# Patient Record
Sex: MALE | Race: BLACK OR AFRICAN AMERICAN | NOT HISPANIC OR LATINO | Employment: FULL TIME | URBAN - METROPOLITAN AREA
[De-identification: names, ages, dates, MRNs, and addresses within clinical notes are randomized per-mention and may not be internally consistent; named-entity substitution may affect disease eponyms.]

---

## 2024-06-04 ENCOUNTER — TELEMEDICINE (OUTPATIENT)
Dept: FAMILY MEDICINE CLINIC | Facility: CLINIC | Age: 29
End: 2024-06-04
Payer: COMMERCIAL

## 2024-06-04 VITALS — WEIGHT: 257 LBS | BODY MASS INDEX: 32.98 KG/M2

## 2024-06-04 DIAGNOSIS — R63.5 WEIGHT GAIN: ICD-10-CM

## 2024-06-04 DIAGNOSIS — R53.83 OTHER FATIGUE: ICD-10-CM

## 2024-06-04 DIAGNOSIS — R79.89 LOW TESTOSTERONE: Primary | ICD-10-CM

## 2024-06-04 DIAGNOSIS — R68.82 LOW LIBIDO: ICD-10-CM

## 2024-06-04 PROCEDURE — 99214 OFFICE O/P EST MOD 30 MIN: CPT

## 2024-06-04 RX ORDER — GINSENG 100 MG
CAPSULE ORAL
COMMUNITY

## 2024-06-04 NOTE — PROGRESS NOTES
Chief Complaint  Fatigue, Weight Gain, and Labs Only    SUBJECTIVE  Willie Desouza presents to [facility] This was an audio and video enabled telemedicine encounter.  Provider location- office- [address]  [place] [address]    Patient location-  [address] [facility] [address]       History of Present Illness    Patient is a 28-year-old male who presents today via telehealth with concerns about low testosterone.  Patient reports he had low testosterone levels in the past.  Patient is requesting some additional testing to evaluate because of this.  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  Patient is established with hematology.  Patient had reviewed testosterone replacement with his hematologist and she suggested since he had a recent blood clot to withhold.  Patient reports he has noticed increased fatigue, weight gain, low sex drive.  History reviewed. No pertinent past medical history.   History reviewed. No pertinent family history.   Past Surgical History:   Procedure Laterality Date    LYMPH NODE BIOPSY  2017    Had both sides behind my neck and my right armpit removed        Current Outpatient Medications:     Omega-3 Fatty Acids (OMEGA-3 FISH OIL CONCENTRATE PO), Take  by mouth., Disp: , Rfl:     rivaroxaban (XARELTO) 20 MG tablet, Take 1 tablet by mouth Daily., Disp: 30 tablet, Rfl: 2    Zinc 50 MG tablet, Take  by mouth., Disp: , Rfl:     cetirizine (zyrTEC) 10 MG tablet, Take 1 tablet by mouth Daily. Indications: Upper Respiratory Tract Allergy (Patient not taking: Reported on 6/4/2024), Disp: , Rfl:     methocarbamol (ROBAXIN) 500 MG tablet, Take 1 tablet by mouth 4 (Four) Times a Day As Needed for Muscle Spasms. (Patient not taking: Reported on 6/4/2024), Disp: , Rfl:     OBJECTIVE  Vital Signs:   Wt 117 kg (257 lb)   BMI 32.98 kg/m²    Estimated body mass index is 32.98 kg/m² as calculated from the following:    Height as  "of 5/8/24: 188 cm (74.02\").    Weight as of this encounter: 117 kg (257 lb).     Wt Readings from Last 3 Encounters:   06/04/24 117 kg (257 lb)   05/08/24 114 kg (252 lb 3.2 oz)   04/16/24 116 kg (256 lb 3.2 oz)     BP Readings from Last 3 Encounters:   05/08/24 134/76   04/16/24 136/79       Physical Exam  Constitutional:       General: He is not in acute distress.     Appearance: He is not ill-appearing.   HENT:      Head: Normocephalic and atraumatic.   Eyes:      Conjunctiva/sclera: Conjunctivae normal.   Pulmonary:      Effort: Pulmonary effort is normal.   Neurological:      Mental Status: He is alert and oriented to person, place, and time.   Psychiatric:         Mood and Affect: Mood normal.         Behavior: Behavior normal.         Thought Content: Thought content normal.         Judgment: Judgment normal.          Result Review    Common labs          4/23/2024    12:10   Common Labs   Glucose 100    BUN 16    Creatinine 0.93    Sodium 140    Potassium 4.3    Chloride 102    Calcium 10.0    Albumin 4.7    Total Bilirubin 0.7    Alkaline Phosphatase 87    AST (SGOT) 19    ALT (SGPT) 26    WBC 4.62    Hemoglobin 15.7    Hematocrit 46.8    Platelets 299    Total Cholesterol 223    Triglycerides 78    HDL Cholesterol 44    LDL Cholesterol  165        US Venous Doppler Lower Extremity Bilateral (duplex)    Result Date: 3/22/2024        No deep venous thrombosis identified of the bilateral lower extremity.  Created by: Gage Palencia MD  Signed by: Gage Palencia MD  Signed on: 3/22/2024 17:40 EDT  Location: [facility]           XR Chest 1 View    Result Date: 3/22/2024   The heart size is within normal limits. There is no evidence of mediastinal shift. There are no focal areas of consolidation in the lungs. No pneumothorax or pleural effusion. The upper abdomen is unremarkable.  Created by: Mp Garza MD    Signed by: Mp Garza MD  Signed on: 3/22/2024 6:04 EDT  [facility]  "          CT Angio Chest Pulmonary Embolism W IV Contrast    Result Date: 3/22/2024   1. Right lower lobe pulmonary arterial emboli with a wedge-shaped opacity at the right lung base that likely represents a developing parenchymal infarct  2. No thoracic aortic aneurysm or dissection  Created by: Salomón Blackburn MD  Signed by: Salomón Blackburn MD  Signed on: 3/22/2024 5:59 EDT  Location: [facility]               The above data has been reviewed by TRACY Villa 06/04/2024 09:26 EDT.          Patient Care Team:  Esperanza Velazco APRN as PCP - General (Nurse Practitioner)            ASSESSMENT & PLAN    Diagnoses and all orders for this visit:    1. Low testosterone (Primary)  -     Dihydrotestosterone; Future  -     Sex Horm Binding Globulin; Future  -     Ferritin; Future  -     Estrogens, Fractionated; Future  -     Estrogens, Total; Future  -     Ambulatory Referral to Endocrinology    2. Other fatigue  -     Dihydrotestosterone; Future  -     Sex Horm Binding Globulin; Future  -     Ferritin; Future  -     Estrogens, Fractionated; Future  -     Estrogens, Total; Future  -     Ambulatory Referral to Endocrinology    3. Weight gain  -     Dihydrotestosterone; Future  -     Sex Horm Binding Globulin; Future  -     Ferritin; Future  -     Estrogens, Fractionated; Future  -     Estrogens, Total; Future  -     Ambulatory Referral to Endocrinology    4. Low libido  -     Dihydrotestosterone; Future  -     Sex Horm Binding Globulin; Future  -     Ferritin; Future  -     Estrogens, Fractionated; Future  -     Estrogens, Total; Future  -     Ambulatory Referral to Endocrinology         Tobacco Use: Low Risk  (6/4/2024)    Patient History     Smoking Tobacco Use: Never     Smokeless Tobacco Use: Never     Passive Exposure: Never       Follow Up     Return if symptoms worsen or fail to improve.      Patient was given instructions and counseling regarding his condition or for health maintenance advice. Please see  specific information pulled into the AVS if appropriate.   I have reviewed information obtained and documented by others and I have confirmed the accuracy of this documented note.    TRACY Villa

## 2024-06-05 ENCOUNTER — HOSPITAL ENCOUNTER (OUTPATIENT)
Dept: CT IMAGING | Facility: HOSPITAL | Age: 29
Discharge: HOME | End: 2024-06-05
Payer: COMMERCIAL

## 2024-06-05 ENCOUNTER — LAB (OUTPATIENT)
Dept: LAB | Facility: HOSPITAL | Age: 29
End: 2024-06-05
Payer: COMMERCIAL

## 2024-06-05 DIAGNOSIS — Z86.711 HISTORY OF PULMONARY EMBOLISM: ICD-10-CM

## 2024-06-05 DIAGNOSIS — R63.5 WEIGHT GAIN: ICD-10-CM

## 2024-06-05 DIAGNOSIS — R79.89 LOW TESTOSTERONE: ICD-10-CM

## 2024-06-05 DIAGNOSIS — R68.82 LOW LIBIDO: ICD-10-CM

## 2024-06-05 DIAGNOSIS — R53.83 OTHER FATIGUE: ICD-10-CM

## 2024-06-05 LAB — FERRITIN SERPL-MCNC: 130 NG/ML (ref 30–400)

## 2024-06-05 PROCEDURE — 71260 CT THORAX DX C+: CPT

## 2024-06-05 PROCEDURE — 82679 ASSAY OF ESTRONE: CPT

## 2024-06-05 PROCEDURE — 82672 ASSAY OF ESTROGEN: CPT

## 2024-06-05 PROCEDURE — 25510000001 IOPAMIDOL PER 1 ML

## 2024-06-05 PROCEDURE — 82642 DIHYDROTESTOSTERONE: CPT

## 2024-06-05 PROCEDURE — 82670 ASSAY OF TOTAL ESTRADIOL: CPT

## 2024-06-05 PROCEDURE — 84270 ASSAY OF SEX HORMONE GLOBUL: CPT

## 2024-06-05 PROCEDURE — 82728 ASSAY OF FERRITIN: CPT

## 2024-06-05 PROCEDURE — 36415 COLL VENOUS BLD VENIPUNCTURE: CPT

## 2024-06-05 RX ADMIN — IOPAMIDOL 100 ML: 755 INJECTION, SOLUTION INTRAVENOUS at 09:34

## 2024-06-06 LAB — SHBG SERPL-SCNC: 22 NMOL/L (ref 16.5–55.9)

## 2024-06-07 LAB
ESTRADIOL SERPL-MCNC: 28 PG/ML (ref 7.6–42.6)
ESTRONE SERPL-MCNC: 39 PG/ML (ref 0–174)

## 2024-06-08 LAB — ESTROGEN SERPL-MCNC: 92 PG/ML (ref 56–213)

## 2024-06-10 LAB — ANDROSTANOLONE SERPL-MCNC: 27 NG/DL

## 2024-06-11 ENCOUNTER — TELEPHONE (OUTPATIENT)
Dept: FAMILY MEDICINE CLINIC | Facility: CLINIC | Age: 29
End: 2024-06-11
Payer: COMMERCIAL

## 2024-06-11 NOTE — TELEPHONE ENCOUNTER
OK FOR HUB TO RELAY  ----- Message from Esperanza Velazco sent at 6/11/2024  7:15 AM EDT -----  DHT just a few points low, otherwise unremarkable hormone panel

## 2024-06-11 NOTE — TELEPHONE ENCOUNTER
"    Name: Otoniel Julian \"Johny\"    Relationship: Self    Best Callback Number: [phone]     HUB PROVIDED THE RELAY MESSAGE FROM THE OFFICE   PATIENT HAS FURTHER QUESTIONS AND WOULD LIKE A CALL BACK AT THE FOLLOWING PHONE NUMBER [phone]        "

## 2024-07-05 DIAGNOSIS — Z79.01 ANTICOAGULATION THERAPY CONTINUED UPON DISCHARGE: ICD-10-CM

## 2024-07-05 DIAGNOSIS — Z86.711 HISTORY OF PULMONARY EMBOLISM: ICD-10-CM

## 2024-07-05 RX ORDER — RIVAROXABAN 20 MG/1
20 TABLET, FILM COATED ORAL DAILY
Qty: 30 TABLET | Refills: 2 | Status: SHIPPED | OUTPATIENT
Start: 2024-07-05

## 2024-08-19 ENCOUNTER — PATIENT MESSAGE (OUTPATIENT)
Dept: ONCOLOGY | Facility: HOSPITAL | Age: 29
End: 2024-08-19
Payer: COMMERCIAL

## 2024-08-21 NOTE — TELEPHONE ENCOUNTER
It looks like the Xarelto was ordered by your PCP Esperanza Velazco. Please call their office and see if they can do a prior auth on this medication for you since they ordered it. If the cost is still high, there are patient assistance programs they can assist you with to help with the cost.

## 2024-08-24 ENCOUNTER — E-VISIT (OUTPATIENT)
Dept: ADMINISTRATIVE | Facility: OTHER | Age: 29
End: 2024-08-24
Payer: COMMERCIAL

## 2024-08-24 NOTE — E-VISIT ESCALATED
Status: Referred Out  Date: 2024 23:41:59  Acuity Level: Not applicable  Referral message:  Your health is our priority. Unfortunately, you are currently located in a state where we are not able to treat patients using virtual care. Please contact a local healthcare provider for assistance. If this is an emergency, please   call 911.  You won't be charged for this visit. Thank you for trusting us with your care!   Patient: Otoniel Julian  Patient : 1995  Patient Address: [address] [place] [address]  Patient Phone: [phone]  Clinician Response: Unavailable  Diagnosis: Unavailable  Diagnosis ICD: Unavailable     Patient Interview Questions and Responses: None available